# Patient Record
Sex: MALE | Race: WHITE
[De-identification: names, ages, dates, MRNs, and addresses within clinical notes are randomized per-mention and may not be internally consistent; named-entity substitution may affect disease eponyms.]

---

## 2017-05-31 ENCOUNTER — APPOINTMENT (OUTPATIENT)
Dept: VASCULAR SURGERY | Facility: CLINIC | Age: 47
End: 2017-05-31

## 2017-05-31 VITALS
HEART RATE: 75 BPM | TEMPERATURE: 97.8 F | BODY MASS INDEX: 24.38 KG/M2 | DIASTOLIC BLOOD PRESSURE: 74 MMHG | HEIGHT: 74 IN | WEIGHT: 190 LBS | SYSTOLIC BLOOD PRESSURE: 118 MMHG

## 2017-08-09 ENCOUNTER — APPOINTMENT (OUTPATIENT)
Dept: VASCULAR SURGERY | Facility: CLINIC | Age: 47
End: 2017-08-09
Payer: COMMERCIAL

## 2017-08-09 PROCEDURE — 37765 STAB PHLEB VEINS XTR 10-20: CPT

## 2017-08-09 PROCEDURE — 36478 ENDOVENOUS LASER 1ST VEIN: CPT

## 2017-08-09 PROCEDURE — 36479 ENDOVENOUS LASER VEIN ADDON: CPT

## 2017-08-11 ENCOUNTER — APPOINTMENT (OUTPATIENT)
Dept: VASCULAR SURGERY | Facility: CLINIC | Age: 47
End: 2017-08-11
Payer: COMMERCIAL

## 2017-08-11 PROCEDURE — 93971 EXTREMITY STUDY: CPT | Mod: RT

## 2017-08-24 ENCOUNTER — APPOINTMENT (OUTPATIENT)
Dept: VASCULAR SURGERY | Facility: CLINIC | Age: 47
End: 2017-08-24
Payer: COMMERCIAL

## 2017-08-24 DIAGNOSIS — Z78.9 OTHER SPECIFIED HEALTH STATUS: ICD-10-CM

## 2017-08-24 DIAGNOSIS — I87.2 VENOUS INSUFFICIENCY (CHRONIC) (PERIPHERAL): ICD-10-CM

## 2017-08-24 PROCEDURE — 99024 POSTOP FOLLOW-UP VISIT: CPT

## 2017-08-24 RX ORDER — RABEPRAZOLE SODIUM 20 MG/1
TABLET, DELAYED RELEASE ORAL
Refills: 0 | Status: ACTIVE | COMMUNITY

## 2017-10-11 ENCOUNTER — APPOINTMENT (OUTPATIENT)
Dept: VASCULAR SURGERY | Facility: CLINIC | Age: 47
End: 2017-10-11
Payer: COMMERCIAL

## 2017-10-11 DIAGNOSIS — I83.812 VARICOSE VEINS OF RIGHT LOWER EXTREMITY WITH PAIN: ICD-10-CM

## 2017-10-11 DIAGNOSIS — I83.811 VARICOSE VEINS OF RIGHT LOWER EXTREMITY WITH PAIN: ICD-10-CM

## 2017-10-11 PROCEDURE — 37765 STAB PHLEB VEINS XTR 10-20: CPT | Mod: 78,LT

## 2017-10-11 PROCEDURE — 36478 ENDOVENOUS LASER 1ST VEIN: CPT | Mod: 78,LT

## 2017-10-11 RX ORDER — HYDROCODONE BITARTRATE AND ACETAMINOPHEN 5; 300 MG/1; MG/1
5-300 TABLET ORAL EVERY 4 HOURS
Qty: 30 | Refills: 0 | Status: ACTIVE | COMMUNITY
Start: 2017-08-09 | End: 1900-01-01

## 2017-10-13 ENCOUNTER — APPOINTMENT (OUTPATIENT)
Dept: VASCULAR SURGERY | Facility: CLINIC | Age: 47
End: 2017-10-13
Payer: COMMERCIAL

## 2017-10-13 PROCEDURE — 93971 EXTREMITY STUDY: CPT | Mod: LT

## 2017-12-28 ENCOUNTER — TRANSCRIPTION ENCOUNTER (OUTPATIENT)
Age: 47
End: 2017-12-28

## 2018-07-24 PROBLEM — I87.2 VENOUS INSUFFICIENCY: Status: ACTIVE | Noted: 2017-08-24

## 2023-01-30 ENCOUNTER — APPOINTMENT (OUTPATIENT)
Dept: ORTHOPEDIC SURGERY | Facility: CLINIC | Age: 53
End: 2023-01-30
Payer: COMMERCIAL

## 2023-01-30 VITALS — WEIGHT: 215 LBS | HEIGHT: 75 IN | BODY MASS INDEX: 26.73 KG/M2

## 2023-01-30 PROCEDURE — J3490M: CUSTOM

## 2023-01-30 PROCEDURE — 99203 OFFICE O/P NEW LOW 30 MIN: CPT | Mod: 25

## 2023-01-30 PROCEDURE — 20605 DRAIN/INJ JOINT/BURSA W/O US: CPT | Mod: LT

## 2023-01-30 PROCEDURE — 99072 ADDL SUPL MATRL&STAF TM PHE: CPT

## 2023-01-30 NOTE — PHYSICAL EXAM
[de-identified] : L hand \par Finger tightness\par Neg tinels, phalens, compression\par ROM 50/50\par
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
## 2023-01-30 NOTE — ASSESSMENT
[FreeTextEntry1] : L volar Wrist injection was performed because of pain inflammation and stiffness\par Anesthesia: ethyl chloride sprayed topically\par Celestone: An injection of Celestone 1cc\par Lidocaine: An injection of Lidocaine 1% 1cc\par Marcaine: An injection of Marcaine 0.5% 1cc\par \par Patient has tried OTC's including aspirin, Ibuprofen, Aleve etc or prescription NSAIDS, and/or exercises at home and/ or\par physical therapy without satisfactory response.\par After verbal consent using sterile preparation and technique. The risks, benefits, and alternatives to cortisone injection\par were explained in full to the patient. Risks outlined include but are not limited to infection, sepsis, bleeding, scarring, skin\par discoloration, temporary increase in pain, syncopal episode, failure to resolve symptoms, allergic reaction, symptom\par recurrence, and elevation of blood sugar in diabetics. Patient understood the risks. All questions were answered. After\par discussion of options, patient requested an injection. Oral informed consent was obtained and sterile prep was done of the\par injection site. Sterile technique was utilized for the procedure including the preparation of the solutions used for the\par injection. Patient tolerated the procedure well. Advised to ice the injection site this evening.\par Prep with betadine locally to site. Sterile technique used

## 2023-02-27 ENCOUNTER — APPOINTMENT (OUTPATIENT)
Dept: ORTHOPEDIC SURGERY | Facility: CLINIC | Age: 53
End: 2023-02-27
Payer: COMMERCIAL

## 2023-02-27 VITALS — HEIGHT: 75 IN | BODY MASS INDEX: 26.73 KG/M2 | WEIGHT: 215 LBS

## 2023-02-27 DIAGNOSIS — S63.502A UNSPECIFIED SPRAIN OF LEFT WRIST, INITIAL ENCOUNTER: ICD-10-CM

## 2023-02-27 DIAGNOSIS — M77.8 OTHER ENTHESOPATHIES, NOT ELSEWHERE CLASSIFIED: ICD-10-CM

## 2023-02-27 DIAGNOSIS — M65.332 TRIGGER FINGER, LEFT MIDDLE FINGER: ICD-10-CM

## 2023-02-27 PROCEDURE — 99213 OFFICE O/P EST LOW 20 MIN: CPT

## 2023-02-27 PROCEDURE — 99072 ADDL SUPL MATRL&STAF TM PHE: CPT

## 2023-02-27 NOTE — PHYSICAL EXAM
[de-identified] : L hand \par MF tightness\par Tender A1 pulley MF\par Neg tinels, phalens, compression\par ROM 50/50\par
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
## 2023-03-23 NOTE — REASON FOR VISIT
Patient requesting lab orders prior to next appt on 3/30/23, I have pended some for you.    [FreeTextEntry2] : NF F/U left hand. pain is better\par inj last visit helped

## 2024-08-29 ENCOUNTER — APPOINTMENT (OUTPATIENT)
Dept: ORTHOPEDIC SURGERY | Facility: CLINIC | Age: 54
End: 2024-08-29
Payer: COMMERCIAL

## 2024-08-29 DIAGNOSIS — M25.375 OTHER INSTABILITY, LEFT FOOT: ICD-10-CM

## 2024-08-29 DIAGNOSIS — Z00.00 ENCOUNTER FOR GENERAL ADULT MEDICAL EXAMINATION W/OUT ABNORMAL FINDINGS: ICD-10-CM

## 2024-08-29 DIAGNOSIS — M76.62 ACHILLES TENDINITIS, LEFT LEG: ICD-10-CM

## 2024-08-29 DIAGNOSIS — M76.61 ACHILLES TENDINITIS, RIGHT LEG: ICD-10-CM

## 2024-08-29 PROCEDURE — 73600 X-RAY EXAM OF ANKLE: CPT | Mod: LT

## 2024-08-29 PROCEDURE — 99203 OFFICE O/P NEW LOW 30 MIN: CPT

## 2024-08-29 PROCEDURE — 73630 X-RAY EXAM OF FOOT: CPT | Mod: 50

## 2024-08-29 NOTE — PHYSICAL EXAM
[Left] : left foot and ankle [5___] : eversion 5[unfilled]/5 [2nd] : 2nd [Right] : right foot and ankle [NL (20)] : dorsiflexion 20 degrees [NL (40)] : plantar flexion 40 degrees [2+] : dorsalis pedis pulse: 2+ [] : no achilles tendon insertion tenderness [FreeTextEntry3] : splaying of the 2nd and 3rd toes

## 2024-08-29 NOTE — DISCUSSION/SUMMARY
[de-identified] : Can try budin splint for the left foot Discussed L foot 2nd toe mtpj stabilization achilles home exercises discussed

## 2024-08-29 NOTE — IMAGING
[Bilateral] : foot bilaterally [Weight -] : weightbearing [There are no fractures, subluxations or dislocations. No significant abnormalities are seen] : There are no fractures, subluxations or dislocations. No significant abnormalities are seen [de-identified] : There is splaying of the 2nd-3rd toes of the left foot. Elongation of the 2nd met.

## 2024-08-29 NOTE — HISTORY OF PRESENT ILLNESS
[de-identified] : 08/29/2024 AURELIANO OSULLIVAN a 54 year old male here for evaluation of his toes on both feet.  Patient states he has pain and splaying  in the left 2nd-3rd toe since 2021 with similar symptoms starting in the right foot over the last few months. Pain localized along the forefoot. States he had seen a podiatrist who said he had a neuroma and then saw a different orthopedist who wanted to do surgery. States he has been having pain/fatigue in the bilateral Achilles since 6/2024. Denies trauma/previous injury. WB in [employer]  He has been taping the toes.  Using toe spacer. Working as a .  [] : no [FreeTextEntry1] : b/l feet left achilles.

## 2024-09-24 ENCOUNTER — NON-APPOINTMENT (OUTPATIENT)
Age: 54
End: 2024-09-24

## 2024-09-25 ENCOUNTER — NON-APPOINTMENT (OUTPATIENT)
Age: 54
End: 2024-09-25

## 2024-09-25 ENCOUNTER — APPOINTMENT (OUTPATIENT)
Dept: CARDIOLOGY | Facility: CLINIC | Age: 54
End: 2024-09-25
Payer: COMMERCIAL

## 2024-09-25 VITALS
HEIGHT: 75 IN | BODY MASS INDEX: 25.12 KG/M2 | SYSTOLIC BLOOD PRESSURE: 112 MMHG | HEART RATE: 68 BPM | WEIGHT: 202 LBS | DIASTOLIC BLOOD PRESSURE: 75 MMHG | OXYGEN SATURATION: 98 %

## 2024-09-25 DIAGNOSIS — Z71.89 OTHER SPECIFIED COUNSELING: ICD-10-CM

## 2024-09-25 PROCEDURE — 93000 ELECTROCARDIOGRAM COMPLETE: CPT

## 2024-09-25 PROCEDURE — 99204 OFFICE O/P NEW MOD 45 MIN: CPT | Mod: 25

## 2024-09-25 PROCEDURE — 99203 OFFICE O/P NEW LOW 30 MIN: CPT | Mod: 25

## 2024-09-25 NOTE — DISCUSSION/SUMMARY
[FreeTextEntry1] : This is a 54 year old man with no cardiac history who presents to the office for a cardiac evaluation . He recently had a life screening, and was noted to have an aortic root at the upper limits of normal.    I reassured him that nothing needs to be done about this.  His exam, EKG, and symptoms profile are normal.  He will follow as needed moving forward.  [EKG obtained to assist in diagnosis and management of assessed problem(s)] : EKG obtained to assist in diagnosis and management of assessed problem(s)

## 2024-09-25 NOTE — HISTORY OF PRESENT ILLNESS
[FreeTextEntry1] : This is a 54 year old man with no cardiac history who presents to the office for a cardiac evaluation . He recently had a life screening, and was noted to have an aortic root at the upper limits of normal.  He has no chest pain, dyspnea, PND, orthopnea, LE swelling, dizziness, or syncope . His dad had LAD disease, and he had a stress test in the past that was normal.  He is active, and has no symptoms with exertion.

## 2024-12-03 ENCOUNTER — APPOINTMENT (OUTPATIENT)
Dept: ORTHOPEDIC SURGERY | Facility: CLINIC | Age: 54
End: 2024-12-03
Payer: COMMERCIAL

## 2024-12-03 VITALS — WEIGHT: 191 LBS | HEIGHT: 75 IN | BODY MASS INDEX: 23.75 KG/M2

## 2024-12-03 DIAGNOSIS — M25.374 OTHER INSTABILITY, RIGHT FOOT: ICD-10-CM

## 2024-12-03 PROCEDURE — 99214 OFFICE O/P EST MOD 30 MIN: CPT

## 2024-12-03 PROCEDURE — 99213 OFFICE O/P EST LOW 20 MIN: CPT

## 2024-12-10 ENCOUNTER — OUTPATIENT (OUTPATIENT)
Dept: OUTPATIENT SERVICES | Facility: HOSPITAL | Age: 54
LOS: 1 days | End: 2024-12-10
Payer: COMMERCIAL

## 2024-12-10 VITALS
WEIGHT: 190.92 LBS | HEART RATE: 71 BPM | HEIGHT: 75 IN | SYSTOLIC BLOOD PRESSURE: 98 MMHG | RESPIRATION RATE: 16 BRPM | OXYGEN SATURATION: 98 % | TEMPERATURE: 98 F | DIASTOLIC BLOOD PRESSURE: 60 MMHG

## 2024-12-10 DIAGNOSIS — Z98.890 OTHER SPECIFIED POSTPROCEDURAL STATES: Chronic | ICD-10-CM

## 2024-12-10 DIAGNOSIS — Z01.818 ENCOUNTER FOR OTHER PREPROCEDURAL EXAMINATION: ICD-10-CM

## 2024-12-10 DIAGNOSIS — Z90.79 ACQUIRED ABSENCE OF OTHER GENITAL ORGAN(S): Chronic | ICD-10-CM

## 2024-12-10 DIAGNOSIS — M25.374 OTHER INSTABILITY, RIGHT FOOT: ICD-10-CM

## 2024-12-10 LAB
ANION GAP SERPL CALC-SCNC: 1 MMOL/L — LOW (ref 5–17)
BUN SERPL-MCNC: 18 MG/DL — SIGNIFICANT CHANGE UP (ref 7–23)
CALCIUM SERPL-MCNC: 8.9 MG/DL — SIGNIFICANT CHANGE UP (ref 8.4–10.5)
CHLORIDE SERPL-SCNC: 105 MMOL/L — SIGNIFICANT CHANGE UP (ref 96–108)
CO2 SERPL-SCNC: 33 MMOL/L — HIGH (ref 22–31)
CREAT SERPL-MCNC: 1.09 MG/DL — SIGNIFICANT CHANGE UP (ref 0.5–1.3)
EGFR: 80 ML/MIN/1.73M2 — SIGNIFICANT CHANGE UP
GLUCOSE SERPL-MCNC: 106 MG/DL — HIGH (ref 70–99)
HCT VFR BLD CALC: 42.1 % — SIGNIFICANT CHANGE UP (ref 39–50)
HGB BLD-MCNC: 14.7 G/DL — SIGNIFICANT CHANGE UP (ref 13–17)
MCHC RBC-ENTMCNC: 32 PG — SIGNIFICANT CHANGE UP (ref 27–34)
MCHC RBC-ENTMCNC: 34.9 G/DL — SIGNIFICANT CHANGE UP (ref 32–36)
MCV RBC AUTO: 91.5 FL — SIGNIFICANT CHANGE UP (ref 80–100)
NRBC # BLD: 0 /100 WBCS — SIGNIFICANT CHANGE UP (ref 0–0)
PLATELET # BLD AUTO: 232 K/UL — SIGNIFICANT CHANGE UP (ref 150–400)
POTASSIUM SERPL-MCNC: 4 MMOL/L — SIGNIFICANT CHANGE UP (ref 3.5–5.3)
POTASSIUM SERPL-SCNC: 4 MMOL/L — SIGNIFICANT CHANGE UP (ref 3.5–5.3)
RBC # BLD: 4.6 M/UL — SIGNIFICANT CHANGE UP (ref 4.2–5.8)
RBC # FLD: 11.8 % — SIGNIFICANT CHANGE UP (ref 10.3–14.5)
SODIUM SERPL-SCNC: 139 MMOL/L — SIGNIFICANT CHANGE UP (ref 135–145)
WBC # BLD: 6.48 K/UL — SIGNIFICANT CHANGE UP (ref 3.8–10.5)
WBC # FLD AUTO: 6.48 K/UL — SIGNIFICANT CHANGE UP (ref 3.8–10.5)

## 2024-12-10 PROCEDURE — 85027 COMPLETE CBC AUTOMATED: CPT

## 2024-12-10 PROCEDURE — 80048 BASIC METABOLIC PNL TOTAL CA: CPT

## 2024-12-10 PROCEDURE — 36415 COLL VENOUS BLD VENIPUNCTURE: CPT

## 2024-12-10 PROCEDURE — G0463: CPT

## 2024-12-10 NOTE — H&P PST ADULT - NSICDXPASTMEDICALHX_GEN_ALL_CORE_FT
PAST MEDICAL HISTORY:  Foot deformity, right     History of prostate cancer     Pain, joint, foot, right

## 2024-12-10 NOTE — H&P PST ADULT - HISTORY OF PRESENT ILLNESS
Pre-procedural Instructions, Procedure scheduled 8/22/24 with Dr. Hahn      Preop Patient Instructions for [facility] Surgeries and Procedures    Welcome! We want you to have the best experience! Thank you for choosing us and trusting us with your care.   If you have any questions regarding your preop instructions, please call:  [phone] 7 am - 3:30 pm M-F     Call your provider immediately if you feel that you cannot make it for your procedure (i.e. cold symptoms, fever, family emergency etc.).    WHEN SHOULD I ARRIVE?    Please arrive to the facility by 0600 AM on 8/22/24.      Your procedure is scheduled at  0730 AM.                                                                                      WHERE DO I GO?    [facility]  [address] [place] [address], [place] [address]    Enter through the Orthopedic South Entrance of the hospital, immediately turn right and take the gold elevators to the 2nd floor.  Upon exiting the elevator, turn right and check in at the registration desk.    TRANSPORTATION    IMPORTANT SAFETY! You must have a responsible adult to drive you home and have a responsible adult stay with you for 24 hours after your procedure. You cannot drive or use public transportation or rideshare by yourself.      WHAT SHOULD I BRING?    Photo ID and insurance card  Do not bring valuables to the hospital  Bring any asthma/COPD inhalers, eyeglasses, dentures or hearing aids (bring cases)  Home medication list  Crutches or walker (if applicable)  Guardianship/POA paperwork (if applicable)    IF you are staying overnight bring:  CPAP machine - if you use one for sleep apnea.  Self-care items such as comb, toothbrush and toothpaste.    HOW TO DRESS, SHOWER OR BATHE THE NIGHT BEFORE SURGERY?      Please follow surgeon's instructions for CHG soap if provided.  If CHG soap not provided please shower the night before and the morning of surgery with an antibacterial soap.     When bathing or showering wash hair as usual with regular shampoo only, do not use conditioner.   After your shower please do not apply any lotions, deodorant, cologne, perfumes, makeup or other skin products.  Do not wear jewelry. Wear loose fitting comfortable clean clothing.   Please brush and floss the night before and morning of your procedure with a new toothbrush.     WHAT CAN I EAT BEFORE I ARRIVE FOR SURGERY?    Stop eating solid foods 8 hours prior to SURGERY time.   Do not use e-cigarettes, or tobacco products, including chewing tobacco.  You may continue to drink approved clear liquids up until 3 hours BEFORE surgery time.       Approved Clear liquids: Water, Propel, Gatorade (any color except red), Apple juice without pulp, Pedialyte, 7-up/ Sprite, PLAIN Black coffee or tea without milk products or lemon. NO NON-DAIRY CREAMERS *If you have diabetes choose low carb/sugar or no carb/sugar options.     Unacceptable Clear liquids: Coffee/ tea WITH milk products, orange juice, alcohol       IMPORTANT SAFETY! FAILURE TO FOLLOW THIS MAY RESULT IN EITHER DELAY OR CANCELLATION OF YOUR PROCEDURE DUE TO THE RISK OF ASPIRATION.       WHEN SHOULD I STOP TAKING MY MEDICATIONS?        Before Surgery Hold (Do Not Take) these Medications  and Supplements:    - Morning of surgery hold any Vitamins AND for 2 weeks prior hold any Vitamin E or Herbals     - Morning of surgery hold (solifenacin (VESICARE) 5 MG tablet [[id number]])    Anticoagulation:none on med list    Antidiabetic:none on med list    DELVIN Risk (Diuretics, ACE-I/ARB, NSAIDs):none on med list       Continue all other medications unless an alternative plan was advised with the surgeon and/or specialist. (Please review your instructions and letter from your surgeon and/or specialist  for specific medication instructions regarding your procedure.)        On Day of surgery, with sips of water only, please take the following medications (if regularly taken in  AM) :    AMLODIPINE  ATENOLOL  OMEPRAZOLE  SERTRALINE    Please follow any additional instructions as instructed by your Physician        If you have any further questions or concerns, please call [phone]   JAELYN Perez   [facility]     55 y/o male with right foot pain for 9 months. 55 y/o male with right foot pain for 9 months. Pain with pressure and not taking any pain meds. Bothering with day today activities and was advised surgery.

## 2024-12-12 RX ORDER — HYDROCODONE BITARTRATE AND ACETAMINOPHEN 5; 325 MG/1; MG/1
5-325 TABLET ORAL
Qty: 20 | Refills: 0 | Status: ACTIVE | COMMUNITY
Start: 2024-12-12 | End: 1900-01-01

## 2024-12-16 ENCOUNTER — TRANSCRIPTION ENCOUNTER (OUTPATIENT)
Age: 54
End: 2024-12-16

## 2024-12-16 ENCOUNTER — APPOINTMENT (OUTPATIENT)
Dept: ORTHOPEDIC SURGERY | Facility: HOSPITAL | Age: 54
End: 2024-12-16
Payer: COMMERCIAL

## 2024-12-16 ENCOUNTER — OUTPATIENT (OUTPATIENT)
Dept: OUTPATIENT SERVICES | Facility: HOSPITAL | Age: 54
LOS: 1 days | End: 2024-12-16
Payer: COMMERCIAL

## 2024-12-16 VITALS
TEMPERATURE: 98 F | RESPIRATION RATE: 14 BRPM | OXYGEN SATURATION: 99 % | HEART RATE: 64 BPM | SYSTOLIC BLOOD PRESSURE: 112 MMHG | DIASTOLIC BLOOD PRESSURE: 70 MMHG

## 2024-12-16 VITALS
TEMPERATURE: 97 F | HEIGHT: 75 IN | HEART RATE: 73 BPM | OXYGEN SATURATION: 98 % | WEIGHT: 189.38 LBS | RESPIRATION RATE: 12 BRPM | SYSTOLIC BLOOD PRESSURE: 116 MMHG | DIASTOLIC BLOOD PRESSURE: 69 MMHG

## 2024-12-16 DIAGNOSIS — Z90.79 ACQUIRED ABSENCE OF OTHER GENITAL ORGAN(S): Chronic | ICD-10-CM

## 2024-12-16 DIAGNOSIS — Z98.890 OTHER SPECIFIED POSTPROCEDURAL STATES: Chronic | ICD-10-CM

## 2024-12-16 DIAGNOSIS — M25.374 OTHER INSTABILITY, RIGHT FOOT: ICD-10-CM

## 2024-12-16 PROCEDURE — C1713: CPT

## 2024-12-16 PROCEDURE — 28308 INCISION OF METATARSAL: CPT | Mod: T6

## 2024-12-16 PROCEDURE — 28234 INCISION OF FOOT TENDON: CPT | Mod: XU,T6

## 2024-12-16 PROCEDURE — 28270 RELEASE OF FOOT CONTRACTURE: CPT | Mod: 59,RT

## 2024-12-16 PROCEDURE — 73630 X-RAY EXAM OF FOOT: CPT | Mod: 26

## 2024-12-16 PROCEDURE — 28308 INCISION OF METATARSAL: CPT | Mod: RT

## 2024-12-16 PROCEDURE — 64450 NJX AA&/STRD OTHER PN/BRANCH: CPT | Mod: RT

## 2024-12-16 PROCEDURE — 28234 INCISION OF FOOT TENDON: CPT | Mod: 59,T6

## 2024-12-16 PROCEDURE — 28208 REPAIR OF FOOT TENDON: CPT | Mod: T6,RT

## 2024-12-16 PROCEDURE — 97161 PT EVAL LOW COMPLEX 20 MIN: CPT

## 2024-12-16 PROCEDURE — 28208 REPAIR OF FOOT TENDON: CPT | Mod: XU,T6

## 2024-12-16 PROCEDURE — 28270 RELEASE OF FOOT CONTRACTURE: CPT | Mod: T6

## 2024-12-16 DEVICE — K-WIRE MICROAIRE (SMOOTH) DOUBLE TROCAR 1.6MM X 4": Type: IMPLANTABLE DEVICE | Status: FUNCTIONAL

## 2024-12-16 RX ORDER — HYDROMORPHONE HYDROCHLORIDE 2 MG/1
0.5 TABLET ORAL
Refills: 0 | Status: DISCONTINUED | OUTPATIENT
Start: 2024-12-16 | End: 2024-12-16

## 2024-12-16 RX ORDER — 0.9 % SODIUM CHLORIDE 0.9 %
1000 INTRAVENOUS SOLUTION INTRAVENOUS
Refills: 0 | Status: DISCONTINUED | OUTPATIENT
Start: 2024-12-16 | End: 2024-12-16

## 2024-12-16 RX ORDER — OXYCODONE HYDROCHLORIDE 30 MG/1
5 TABLET ORAL ONCE
Refills: 0 | Status: DISCONTINUED | OUTPATIENT
Start: 2024-12-16 | End: 2024-12-16

## 2024-12-16 RX ORDER — HYDROMORPHONE HYDROCHLORIDE 2 MG/1
0.2 TABLET ORAL
Refills: 0 | Status: DISCONTINUED | OUTPATIENT
Start: 2024-12-16 | End: 2024-12-16

## 2024-12-16 RX ORDER — CHLORHEXIDINE GLUCONATE 1.2 MG/ML
1 RINSE ORAL ONCE
Refills: 0 | Status: COMPLETED | OUTPATIENT
Start: 2024-12-16 | End: 2024-12-16

## 2024-12-16 RX ORDER — APREPITANT 40 MG/1
40 CAPSULE ORAL ONCE
Refills: 0 | Status: COMPLETED | OUTPATIENT
Start: 2024-12-16 | End: 2024-12-16

## 2024-12-16 RX ORDER — ONDANSETRON HYDROCHLORIDE 4 MG/1
4 TABLET, FILM COATED ORAL ONCE
Refills: 0 | Status: DISCONTINUED | OUTPATIENT
Start: 2024-12-16 | End: 2024-12-16

## 2024-12-16 RX ORDER — CEFAZOLIN SODIUM 10 G
2000 VIAL (EA) INJECTION ONCE
Refills: 0 | Status: COMPLETED | OUTPATIENT
Start: 2024-12-16 | End: 2024-12-16

## 2024-12-16 RX ADMIN — Medication 100 MILLIGRAM(S): at 16:15

## 2024-12-16 RX ADMIN — CHLORHEXIDINE GLUCONATE 1 APPLICATION(S): 1.2 RINSE ORAL at 13:08

## 2024-12-16 RX ADMIN — Medication 75 MILLILITER(S): at 16:27

## 2024-12-16 RX ADMIN — APREPITANT 40 MILLIGRAM(S): 40 CAPSULE ORAL at 13:08

## 2024-12-16 NOTE — ASU DISCHARGE PLAN (ADULT/PEDIATRIC) - NS MD DC FALL RISK RISK
For information on Fall & Injury Prevention, visit: [email or web address] OR  [email or web address] OR  https://www.cdc.gov/steadi/patient.html

## 2024-12-16 NOTE — PHYSICAL THERAPY INITIAL EVALUATION ADULT - ADDITIONAL COMMENTS
Pt lives with family in a private house, there are 8 stairs to enter and no stairs to negotiate within. PTA pt was independent with ADLs and ambulation.

## 2024-12-16 NOTE — ASU DISCHARGE PLAN (ADULT/PEDIATRIC) - CALL YOUR DOCTOR IF YOU HAVE ANY OF THE FOLLOWING:
Swelling that gets worse/Pain not relieved by Medications/Fever greater than (need to indicate Fahrenheit or Celsius)/Unable to urinate Bleeding that does not stop/Swelling that gets worse/Pain not relieved by Medications/Fever greater than (need to indicate Fahrenheit or Celsius)/Wound/Surgical Site with redness, or foul smelling discharge or pus/Numbness, tingling, color or temperature change to extremity/Unable to urinate/Increased irritability or sluggishness

## 2024-12-16 NOTE — BRIEF OPERATIVE NOTE - NSICDXBRIEFPROCEDURE_GEN_ALL_CORE_FT
PROCEDURES:  Weil osteotomy 16-Dec-2024 16:00:48  Jason Caceres  
PROCEDURES:  Capsulotomy of MTP joint 16-Dec-2024 16:02:15  Jesse Noguera  Tenotomy, toe extensor 16-Dec-2024 16:02:37  Jesse Noguera  Repair, foot extensor tendon, primary 16-Dec-2024 16:03:21  Jesse Noguera

## 2024-12-16 NOTE — ASU DISCHARGE PLAN (ADULT/PEDIATRIC) - CARE PROVIDER_API CALL
Jesse Noguera  Orthopaedic Surgery  [facility], [address]  [place] [address]  Phone: [phone]  Fax: [phone]  Follow Up Time: 1 week

## 2024-12-16 NOTE — PHYSICAL THERAPY INITIAL EVALUATION ADULT - DID THE PATIENT HAVE SURGERY?
scheduled for right foot second metatarsal osteotomy metatarsophalangeal joint capsulotomy second toe hammer toe correction with extensor digitorum brevis tenotomy flexor tenotomy/yes

## 2024-12-16 NOTE — ASU DISCHARGE PLAN (ADULT/PEDIATRIC) - FINANCIAL ASSISTANCE
[facility] provides services at a reduced cost to those who are determined to be eligible through [facility]’s financial assistance program. Information regarding [facility]’s financial assistance program can be found by going to [email or web address] or by calling [phone].

## 2024-12-16 NOTE — PHYSICAL THERAPY INITIAL EVALUATION ADULT - NSACTIVITYREC_GEN_A_PT
Pt is independent with transfers/ambulation with b/l axillary crutches. Educated pt on RLE NWB, pt able to maintain throughout. Educated pt on and pt independently performed transfers/ambulation with b/l axillary crutches while maintaining RLE HWB in the event of a weightbearing status change following surgery. Educated pt on ascending/descending stairs with b/l axillary crutches while maintaining both RLE NWB & HWB. Educated pt on ascending/descending stairs via bumping up/down on bottom. Pt verbalized understanding/agreement to above recommendations/education. All pts questions answered to pt satisfaction. Pt requires no skilled PT and is cleared from PT services for dc home.

## 2024-12-16 NOTE — PHYSICAL THERAPY INITIAL EVALUATION ADULT - PERTINENT HX OF CURRENT PROBLEM, REHAB EVAL
Pt is a 53 y/o male with right foot instability. Pt is scheduled for right foot second metatarsal osteotomy metatarsophalangeal joint capsulotomy second toe hammer toe correction with extensor digitorum brevis tenotomy flexor tenotomy today on 12/16/24.

## 2024-12-24 ENCOUNTER — APPOINTMENT (OUTPATIENT)
Dept: ORTHOPEDIC SURGERY | Facility: CLINIC | Age: 54
End: 2024-12-24
Payer: COMMERCIAL

## 2024-12-24 VITALS — BODY MASS INDEX: 23.75 KG/M2 | WEIGHT: 191 LBS | HEIGHT: 75 IN

## 2024-12-24 DIAGNOSIS — M25.374 OTHER INSTABILITY, RIGHT FOOT: ICD-10-CM

## 2024-12-24 PROBLEM — Z85.46 PERSONAL HISTORY OF MALIGNANT NEOPLASM OF PROSTATE: Chronic | Status: ACTIVE | Noted: 2024-12-10

## 2024-12-24 PROBLEM — M25.571 PAIN IN RIGHT ANKLE AND JOINTS OF RIGHT FOOT: Chronic | Status: ACTIVE | Noted: 2024-12-10

## 2024-12-24 PROBLEM — M21.961 UNSPECIFIED ACQUIRED DEFORMITY OF RIGHT LOWER LEG: Chronic | Status: ACTIVE | Noted: 2024-12-10

## 2024-12-24 PROCEDURE — 73630 X-RAY EXAM OF FOOT: CPT | Mod: RT

## 2024-12-24 PROCEDURE — 99024 POSTOP FOLLOW-UP VISIT: CPT

## 2025-01-10 ENCOUNTER — APPOINTMENT (OUTPATIENT)
Dept: ORTHOPEDIC SURGERY | Facility: CLINIC | Age: 55
End: 2025-01-10
Payer: COMMERCIAL

## 2025-01-10 DIAGNOSIS — M25.374 OTHER INSTABILITY, RIGHT FOOT: ICD-10-CM

## 2025-01-10 DIAGNOSIS — M25.375 OTHER INSTABILITY, LEFT FOOT: ICD-10-CM

## 2025-01-10 PROCEDURE — 99024 POSTOP FOLLOW-UP VISIT: CPT | Mod: 57

## 2025-01-10 PROCEDURE — 99214 OFFICE O/P EST MOD 30 MIN: CPT | Mod: 57

## 2025-02-04 ENCOUNTER — APPOINTMENT (OUTPATIENT)
Dept: ORTHOPEDIC SURGERY | Facility: CLINIC | Age: 55
End: 2025-02-04
Payer: COMMERCIAL

## 2025-02-04 VITALS — HEIGHT: 75 IN | BODY MASS INDEX: 24.25 KG/M2 | WEIGHT: 195 LBS

## 2025-02-04 DIAGNOSIS — M25.374 OTHER INSTABILITY, RIGHT FOOT: ICD-10-CM

## 2025-02-04 DIAGNOSIS — M25.375 OTHER INSTABILITY, LEFT FOOT: ICD-10-CM

## 2025-02-04 PROCEDURE — 99214 OFFICE O/P EST MOD 30 MIN: CPT

## 2025-02-24 ENCOUNTER — OUTPATIENT (OUTPATIENT)
Dept: OUTPATIENT SERVICES | Facility: HOSPITAL | Age: 55
LOS: 1 days | End: 2025-02-24
Payer: COMMERCIAL

## 2025-02-24 VITALS
OXYGEN SATURATION: 98 % | TEMPERATURE: 99 F | HEIGHT: 75 IN | DIASTOLIC BLOOD PRESSURE: 78 MMHG | RESPIRATION RATE: 18 BRPM | SYSTOLIC BLOOD PRESSURE: 134 MMHG | WEIGHT: 190.92 LBS | HEART RATE: 78 BPM

## 2025-02-24 DIAGNOSIS — Z98.890 OTHER SPECIFIED POSTPROCEDURAL STATES: Chronic | ICD-10-CM

## 2025-02-24 DIAGNOSIS — M25.375 OTHER INSTABILITY, LEFT FOOT: Chronic | ICD-10-CM

## 2025-02-24 DIAGNOSIS — Z01.818 ENCOUNTER FOR OTHER PREPROCEDURAL EXAMINATION: ICD-10-CM

## 2025-02-24 DIAGNOSIS — Z90.79 ACQUIRED ABSENCE OF OTHER GENITAL ORGAN(S): Chronic | ICD-10-CM

## 2025-02-24 DIAGNOSIS — M25.375 OTHER INSTABILITY, LEFT FOOT: ICD-10-CM

## 2025-02-24 PROCEDURE — 93010 ELECTROCARDIOGRAM REPORT: CPT

## 2025-02-24 PROCEDURE — 93005 ELECTROCARDIOGRAM TRACING: CPT

## 2025-02-24 PROCEDURE — G0463: CPT

## 2025-02-24 NOTE — H&P PST ADULT - NSICDXPASTMEDICALHX_GEN_ALL_CORE_FT
PAST MEDICAL HISTORY:  Foot deformity, right     History of prostate cancer     Pain in joint, foot, left     Pain, joint, foot, right

## 2025-02-24 NOTE — H&P PST ADULT - NSICDXPASTSURGICALHX_GEN_ALL_CORE_FT
PAST SURGICAL HISTORY:  Foot joint instability, left     History of prostatectomy     S/P arthroscopy of left knee     Status post osteotomy

## 2025-02-24 NOTE — H&P PST ADULT - NSICDXPROCEDURE_GEN_ALL_CORE_FT
PROCEDURES:  Osteotomy, metatarsal, second 24-Feb-2025 07:08:54 left foot second metatarsal osteotomy, metatarsophalangeal joint Melissa Ellington

## 2025-02-24 NOTE — H&P PST ADULT - HISTORY OF PRESENT ILLNESS
Started first trimester 53 y/o male with left  foot pain for long time getting worse for the past 3  months. Pain with pressure and not taking any pain meds. Difficulty with daily  activities and was advised surgery. 53 y/o male with left  2nd toe foot pain for long time getting worse for the past 3  months. Pain with pressure and not taking any pain meds. Difficulty with daily  activities and was advised surgery. Prior R foot surgery recovered.

## 2025-02-24 NOTE — H&P PST ADULT - ASSESSMENT
53 y/o male with left   -foot deformity  scheduled surgery- left foot second metatarsal osteotomy  medical clearance  Pre op instructions provided  Instructions provided on medications to continue and to take the day morning of surgery

## 2025-03-05 RX ORDER — APREPITANT 40 MG/1
40 CAPSULE ORAL ONCE
Refills: 0 | Status: COMPLETED | OUTPATIENT
Start: 2025-03-10 | End: 2025-03-10

## 2025-03-10 ENCOUNTER — TRANSCRIPTION ENCOUNTER (OUTPATIENT)
Age: 55
End: 2025-03-10

## 2025-03-10 ENCOUNTER — APPOINTMENT (OUTPATIENT)
Dept: ORTHOPEDIC SURGERY | Facility: HOSPITAL | Age: 55
End: 2025-03-10
Payer: COMMERCIAL

## 2025-03-10 ENCOUNTER — OUTPATIENT (OUTPATIENT)
Dept: OUTPATIENT SERVICES | Facility: HOSPITAL | Age: 55
LOS: 1 days | End: 2025-03-10
Payer: COMMERCIAL

## 2025-03-10 VITALS
RESPIRATION RATE: 12 BRPM | HEIGHT: 75 IN | SYSTOLIC BLOOD PRESSURE: 121 MMHG | WEIGHT: 195.55 LBS | TEMPERATURE: 97 F | HEART RATE: 75 BPM | OXYGEN SATURATION: 100 % | DIASTOLIC BLOOD PRESSURE: 73 MMHG

## 2025-03-10 VITALS
RESPIRATION RATE: 16 BRPM | DIASTOLIC BLOOD PRESSURE: 77 MMHG | TEMPERATURE: 98 F | SYSTOLIC BLOOD PRESSURE: 107 MMHG | OXYGEN SATURATION: 100 % | HEART RATE: 64 BPM

## 2025-03-10 DIAGNOSIS — M25.375 OTHER INSTABILITY, LEFT FOOT: ICD-10-CM

## 2025-03-10 DIAGNOSIS — Z98.890 OTHER SPECIFIED POSTPROCEDURAL STATES: Chronic | ICD-10-CM

## 2025-03-10 DIAGNOSIS — M25.375 OTHER INSTABILITY, LEFT FOOT: Chronic | ICD-10-CM

## 2025-03-10 DIAGNOSIS — Z90.79 ACQUIRED ABSENCE OF OTHER GENITAL ORGAN(S): Chronic | ICD-10-CM

## 2025-03-10 PROCEDURE — 28270 RELEASE OF FOOT CONTRACTURE: CPT | Mod: 79,59,LT

## 2025-03-10 PROCEDURE — 28308 INCISION OF METATARSAL: CPT | Mod: T1

## 2025-03-10 PROCEDURE — 97161 PT EVAL LOW COMPLEX 20 MIN: CPT

## 2025-03-10 PROCEDURE — 28208 REPAIR OF FOOT TENDON: CPT | Mod: XU,T1

## 2025-03-10 PROCEDURE — 28270 RELEASE OF FOOT CONTRACTURE: CPT | Mod: XU,T1

## 2025-03-10 PROCEDURE — 28234 INCISION OF FOOT TENDON: CPT | Mod: 79,T1

## 2025-03-10 PROCEDURE — 28285 REPAIR OF HAMMERTOE: CPT | Mod: 79,T1

## 2025-03-10 PROCEDURE — 76000 FLUOROSCOPY <1 HR PHYS/QHP: CPT

## 2025-03-10 PROCEDURE — C1713: CPT

## 2025-03-10 PROCEDURE — 64450 NJX AA&/STRD OTHER PN/BRANCH: CPT | Mod: XU,LT

## 2025-03-10 PROCEDURE — 28234 INCISION OF FOOT TENDON: CPT | Mod: XU,T1

## 2025-03-10 PROCEDURE — 28208 REPAIR OF FOOT TENDON: CPT | Mod: 79,T1

## 2025-03-10 PROCEDURE — 28285 REPAIR OF HAMMERTOE: CPT | Mod: T1

## 2025-03-10 PROCEDURE — 73630 X-RAY EXAM OF FOOT: CPT | Mod: 26

## 2025-03-10 PROCEDURE — 64450 NJX AA&/STRD OTHER PN/BRANCH: CPT | Mod: LT

## 2025-03-10 PROCEDURE — 28308 INCISION OF METATARSAL: CPT | Mod: 79,59,LT

## 2025-03-10 DEVICE — K-WIRE MICROAIRE (SMOOTH) DOUBLE TROCAR 1.1MM X 4": Type: IMPLANTABLE DEVICE | Site: LEFT | Status: FUNCTIONAL

## 2025-03-10 DEVICE — K-WIRE MICROAIRE (SMOOTH) DOUBLE TROCAR 1.6MM X 4": Type: IMPLANTABLE DEVICE | Site: LEFT | Status: FUNCTIONAL

## 2025-03-10 RX ORDER — ONDANSETRON HCL/PF 4 MG/2 ML
4 VIAL (ML) INJECTION ONCE
Refills: 0 | Status: DISCONTINUED | OUTPATIENT
Start: 2025-03-10 | End: 2025-03-24

## 2025-03-10 RX ORDER — OXYCODONE HYDROCHLORIDE 30 MG/1
5 TABLET ORAL ONCE
Refills: 0 | Status: DISCONTINUED | OUTPATIENT
Start: 2025-03-10 | End: 2025-03-10

## 2025-03-10 RX ORDER — HYDROMORPHONE/SOD CHLOR,ISO/PF 2 MG/10 ML
0.2 SYRINGE (ML) INJECTION
Refills: 0 | Status: DISCONTINUED | OUTPATIENT
Start: 2025-03-10 | End: 2025-03-10

## 2025-03-10 RX ORDER — CEFAZOLIN SODIUM IN 0.9 % NACL 3 G/100 ML
2000 INTRAVENOUS SOLUTION, PIGGYBACK (ML) INTRAVENOUS ONCE
Refills: 0 | Status: COMPLETED | OUTPATIENT
Start: 2025-03-10 | End: 2025-03-10

## 2025-03-10 RX ORDER — HYDROMORPHONE/SOD CHLOR,ISO/PF 2 MG/10 ML
0.5 SYRINGE (ML) INJECTION
Refills: 0 | Status: DISCONTINUED | OUTPATIENT
Start: 2025-03-10 | End: 2025-03-10

## 2025-03-10 RX ORDER — SODIUM CHLORIDE 9 G/1000ML
1000 INJECTION, SOLUTION INTRAVENOUS
Refills: 0 | Status: DISCONTINUED | OUTPATIENT
Start: 2025-03-10 | End: 2025-03-24

## 2025-03-10 RX ADMIN — SODIUM CHLORIDE 75 MILLILITER(S): 9 INJECTION, SOLUTION INTRAVENOUS at 14:03

## 2025-03-10 RX ADMIN — APREPITANT 40 MILLIGRAM(S): 40 CAPSULE ORAL at 11:06

## 2025-03-10 RX ADMIN — Medication 1 APPLICATION(S): at 11:07

## 2025-03-10 NOTE — ASU DISCHARGE PLAN (ADULT/PEDIATRIC) - NURSING INSTRUCTIONS
DO NOT TAKE ANYTHING CONTAINING TYLENOL UNTIL AFTER 7PM    DO NOT TAKE ANYTHING CONTAINING NSAIDS - IF ALLOWED BY YOUR SURGEON  ( MOTRIN, ADVIL, NAPROXEN, ALEVE , IBUPROFEN) UNTIL AFTER 9PM

## 2025-03-10 NOTE — PHYSICAL THERAPY INITIAL EVALUATION ADULT - PERTINENT HX OF CURRENT PROBLEM, REHAB EVAL
55 y/o male with left  foot pain for long time getting worse for the past 3  months. Pain with pressure and not taking any pain meds. Difficulty with daily  activities and was advised surgery.

## 2025-03-10 NOTE — ASU DISCHARGE PLAN (ADULT/PEDIATRIC) - CARE PROVIDER_API CALL
Jesse Noguera  Orthopaedic Surgery  [address] [place] [address], [address] [place] [id number]  Phone: [phone]  Fax: [phone]  Established Patient  Follow Up Time: 2 weeks

## 2025-03-10 NOTE — PHYSICAL THERAPY INITIAL EVALUATION ADULT - HEALTH SCREEN CRITERIA
Informed pt that 9 packs was sent to her pharmacy the day of her BP check.
Pt seen today for BP check, pt's BP was 127/76 and pulse 87. Pt has no complaints at this time. Medication pended for refill and pharmacy updated.
Pt states she has been waiting for a call back about her refill for [facility] and has not heard back would like to spk w nurse and pls call once sent she has no refills.   LV  if no answer, [phone]
yes

## 2025-03-10 NOTE — BRIEF OPERATIVE NOTE - NSICDXBRIEFPOSTOP_GEN_ALL_CORE_FT
POST-OP DIAGNOSIS:  Metatarsalgia, left foot 10-Mar-2025 13:19:18  Scotty Rangel  Subluxation of left toe 10-Mar-2025 13:19:24  Scotty Rangel

## 2025-03-10 NOTE — ASU DISCHARGE PLAN (ADULT/PEDIATRIC) - FINANCIAL ASSISTANCE
[facility] provides services at a reduced cost to those who are determined to be eligible through [facility]’s financial assistance program. Information regarding [facility]’s financial assistance program can be found by going to [email or web address] or by calling [phone].

## 2025-03-10 NOTE — ASU DISCHARGE PLAN (ADULT/PEDIATRIC) - ASU DC SPECIAL INSTRUCTIONSFT
Keep dressings clean, dry, and intact until first post-op appointment.  Ice and elevate left lower extremity.   Take pain medication as directed by Dr. Noguera for pain control.   Remain NON-WEIGHT BEARING to left foot.

## 2025-03-10 NOTE — PHYSICAL THERAPY INITIAL EVALUATION ADULT - RANGE OF MOTION EXAMINATION, REHAB EVAL
bilateral upper extremity ROM was WNL (within normal limits)/Left LE ROM was WNL (within normal limits)/Right LE ROM was WNL (within normal limits)

## 2025-03-10 NOTE — BRIEF OPERATIVE NOTE - TYPE OF ANESTHESIA
General
[facility]

## 2025-03-10 NOTE — PHYSICAL THERAPY INITIAL EVALUATION ADULT - DID THE PATIENT HAVE SURGERY?
pt scheduled for left foot second metatarsal osteotomy MTP capsulotomy,EDL lengthening and EDB tenotomy today/yes

## 2025-03-10 NOTE — ASU PATIENT PROFILE, ADULT - BLOOD TRANSFUSION, PREVIOUS, PROFILE
Patient is transferred to [facility] , transferred by security and nursing staff. Calm and cooperative left ed, accompanied by mother. no

## 2025-03-10 NOTE — BRIEF OPERATIVE NOTE - NSICDXBRIEFPROCEDURE_GEN_ALL_CORE_FT
PROCEDURES:  Osteotomy of second metatarsal 10-Mar-2025 13:13:02  Jesse Noguera  Arthroplasty, toe, PIP joint, for hammertoe 10-Mar-2025 13:13:20  Jesse Noguera  
PROCEDURES:  Osteotomy of second metatarsal 10-Mar-2025 13:13:02  Jesse Noguera  Arthroplasty, toe, PIP joint, for hammertoe 10-Mar-2025 13:13:20  Jesse Noguera  Arthroplasty of proximal interphalangeal (PIP) joint of second toe 10-Mar-2025 13:17:27  Jesse Noguera

## 2025-03-10 NOTE — ASU PATIENT PROFILE, ADULT - NS PRO ABUSE SCREEN SUSPICION NEGLECT YN
Render Post-Care Instructions In Note?: no
Number Of Freeze-Thaw Cycles: 2 freeze-thaw cycles
Duration Of Freeze Thaw-Cycle (Seconds): 2
Post-Care Instructions: I reviewed with the patient in detail post-care instructions. Patient is to wear sunprotection, and avoid picking at any of the treated lesions. Pt may apply Vaseline to crusted or scabbing areas.
Show Aperture Variable?: Yes
Consent: The patient's consent was obtained including but not limited to risks of crusting, scabbing, blistering, scarring, darker or lighter pigmentary change, recurrence, incomplete removal and infection.
Detail Level: Simple
no

## 2025-03-10 NOTE — BRIEF OPERATIVE NOTE - NSICDXBRIEFPREOP_GEN_ALL_CORE_FT
PRE-OP DIAGNOSIS:  Metatarsalgia, left foot 10-Mar-2025 13:18:29  Scotty Rangel  Subluxation of left toe 10-Mar-2025 13:19:12  Scotty Rangel

## 2025-03-10 NOTE — PHYSICAL THERAPY INITIAL EVALUATION ADULT - ADDITIONAL COMMENTS
pvt home with 6STE w/o then plans to stay on main floor, bed/bath upstairs 13 steps w/HR. Pt has ax crutches from past surgery few months ago same surgery on the right LE. Pt drives. Pt has past experience of crutch amb/NWB then heel WBing to WBAT

## 2025-03-11 ENCOUNTER — APPOINTMENT (OUTPATIENT)
Dept: ORTHOPEDIC SURGERY | Facility: CLINIC | Age: 55
End: 2025-03-11
Payer: COMMERCIAL

## 2025-03-11 PROBLEM — M25.572 PAIN IN LEFT ANKLE AND JOINTS OF LEFT FOOT: Chronic | Status: ACTIVE | Noted: 2025-02-24

## 2025-03-11 PROCEDURE — 99024 POSTOP FOLLOW-UP VISIT: CPT

## 2025-03-18 ENCOUNTER — NON-APPOINTMENT (OUTPATIENT)
Age: 55
End: 2025-03-18

## 2025-03-18 ENCOUNTER — APPOINTMENT (OUTPATIENT)
Dept: ORTHOPEDIC SURGERY | Facility: CLINIC | Age: 55
End: 2025-03-18
Payer: COMMERCIAL

## 2025-03-18 ENCOUNTER — RESULT CHARGE (OUTPATIENT)
Age: 55
End: 2025-03-18

## 2025-03-18 DIAGNOSIS — M25.375 OTHER INSTABILITY, LEFT FOOT: ICD-10-CM

## 2025-03-18 PROCEDURE — 99024 POSTOP FOLLOW-UP VISIT: CPT

## 2025-03-18 PROCEDURE — 73630 X-RAY EXAM OF FOOT: CPT | Mod: LT

## 2025-04-03 ENCOUNTER — APPOINTMENT (OUTPATIENT)
Dept: ORTHOPEDIC SURGERY | Facility: CLINIC | Age: 55
End: 2025-04-03

## 2025-04-08 ENCOUNTER — APPOINTMENT (OUTPATIENT)
Dept: ORTHOPEDIC SURGERY | Facility: CLINIC | Age: 55
End: 2025-04-08
Payer: COMMERCIAL

## 2025-04-08 DIAGNOSIS — M25.375 OTHER INSTABILITY, LEFT FOOT: ICD-10-CM

## 2025-04-08 PROCEDURE — 99024 POSTOP FOLLOW-UP VISIT: CPT

## 2025-05-20 ENCOUNTER — APPOINTMENT (OUTPATIENT)
Dept: ORTHOPEDIC SURGERY | Facility: CLINIC | Age: 55
End: 2025-05-20

## 2025-07-23 NOTE — H&P PST ADULT - AS BP NONINV METHOD
Left voice message with contact information regarding rescheduling his missed follow up appointment.   
electronic

## (undated) DEVICE — Device

## (undated) DEVICE — DRAPE TOWEL BLUE 17" X 24"

## (undated) DEVICE — ELCTR BOVIE TIP NEEDLE INSULATED 2.8" EDGE

## (undated) DEVICE — YELLOW PIN COVER

## (undated) DEVICE — DRSG KLING 3"

## (undated) DEVICE — WARMING BLANKET UPPER ADULT

## (undated) DEVICE — DRSG STERISTRIPS 0.5 X 4"

## (undated) DEVICE — SAW BLADE STRYKER PRECISION THIN SAGITTAL MEDIUM 9MM X 25MM

## (undated) DEVICE — DRSG COBAN 6"

## (undated) DEVICE — DRSG STOCKINETTE CLOTH 6 X 72"

## (undated) DEVICE — DRSG ACE BANDAGE 4" NS

## (undated) DEVICE — DRSG XEROFORM 5 X 9"

## (undated) DEVICE — SUT MONOSOF 3-0 18" P-12

## (undated) DEVICE — TOURNIQUET CUFF 18" DUAL PORT SINGLE BLADDER W PLC  (BLACK)

## (undated) DEVICE — SUT VICRYL PLUS 3-0 18" PS-2 UNDYED

## (undated) DEVICE — ELCTR STRYKER NEPTUNE SMOKE EVACUATION PENCIL (GREEN)

## (undated) DEVICE — DRSG ACE BANDAGE 4"

## (undated) DEVICE — SAW BLADE STRYKER SAGITTAL OSCILLATING 5.5X18X0.38MM

## (undated) DEVICE — PACK LOWER EXTREMITY

## (undated) DEVICE — SYR ASEPTO

## (undated) DEVICE — DRAPE C ARM MINI

## (undated) DEVICE — SUT MONOCRYL 4-0 27" PS-2 UNDYED

## (undated) DEVICE — ELCTR BOVIE TIP BLADE INSULATED 2.75" EDGE

## (undated) DEVICE — DRSG WEBRIL 3"

## (undated) DEVICE — DRSG WEBRIL 4"

## (undated) DEVICE — SUT POLYSORB 3-0 30" V-20

## (undated) DEVICE — DRAPE 3/4 SHEET 52X76"

## (undated) DEVICE — GLV 8.5 PROTEXIS (WHITE)

## (undated) DEVICE — DRSG COMBINE 5 X 9"

## (undated) DEVICE — DRSG CURITY GAUZE SPONGE 4 X 4" 12-PLY